# Patient Record
Sex: FEMALE | Race: WHITE
[De-identification: names, ages, dates, MRNs, and addresses within clinical notes are randomized per-mention and may not be internally consistent; named-entity substitution may affect disease eponyms.]

---

## 2018-01-19 ENCOUNTER — HOSPITAL ENCOUNTER (OUTPATIENT)
Dept: HOSPITAL 74 - FASU | Age: 83
Discharge: HOME | End: 2018-01-19
Attending: PLASTIC SURGERY
Payer: COMMERCIAL

## 2018-01-19 VITALS — DIASTOLIC BLOOD PRESSURE: 70 MMHG | HEART RATE: 89 BPM | SYSTOLIC BLOOD PRESSURE: 130 MMHG

## 2018-01-19 VITALS — TEMPERATURE: 97.7 F

## 2018-01-19 VITALS — BODY MASS INDEX: 23.6 KG/M2

## 2018-01-19 DIAGNOSIS — C44.112: Primary | ICD-10-CM

## 2018-01-19 PROCEDURE — 08QQXZZ REPAIR RIGHT LOWER EYELID, EXTERNAL APPROACH: ICD-10-PCS | Performed by: PLASTIC SURGERY

## 2018-01-19 PROCEDURE — 08UQX7Z SUPPLEMENT RIGHT LOWER EYELID WITH AUTOLOGOUS TISSUE SUBSTITUTE, EXTERNAL APPROACH: ICD-10-PCS | Performed by: PLASTIC SURGERY

## 2018-01-19 PROCEDURE — 0HX1XZZ TRANSFER FACE SKIN, EXTERNAL APPROACH: ICD-10-PCS | Performed by: PLASTIC SURGERY

## 2018-01-19 PROCEDURE — 08SP0ZZ REPOSITION LEFT UPPER EYELID, OPEN APPROACH: ICD-10-PCS | Performed by: PLASTIC SURGERY

## 2018-01-19 PROCEDURE — 08QNXZZ REPAIR RIGHT UPPER EYELID, EXTERNAL APPROACH: ICD-10-PCS | Performed by: PLASTIC SURGERY

## 2018-01-19 NOTE — OP
Operative Note





- Note:


Operative Date: 01/19/18


Pre-Operative Diagnosis: right lower and upper eyelid defect


Operation: right upper to lower tarsoconjunctival flap, right lateral 

canthoplasty, right eyelid reconstruction by Tenzel flap, scalp flap 

reconstruction of donor site, tarsorrhaphy suture


Post-Operative Diagnosis: Same as Pre-op


Surgeon: Goldberg,Neal D


Anesthesia: General, Local


Operative Report Dictated: Yes

## 2018-01-20 NOTE — OP
DATE OF OPERATION:  01/19/2018

 

PROCEDURE:  Right lower eyelid reconstruction with the following methods:

1.  Right upper-to-lower tarsoconjunctival flap for conjunctivae and support of the

right lower eyelid.

2.  Right lower eyelid lateral canthoplasty, reconstruction of lateral canthal

attachment.

3.  Tenzel cheek advancement flap reconstruction of right lower eyelid.

4.  Temporal scalp flap reconstruction of donor site of Tenzel flap.

5.  Lateral tarsorrhaphy Frost suture temporary closure of right eyelid.

6.  Excision and debris of existing wound in preparation for flap closure.

 

ATTENDING SURGEON:  Neal Goldberg, MD 

 

ASSISTANT:  None.

 

ANESTHESIA:  General endotracheal anesthesia with a total of 5 mL of 1% lidocaine

with 1:100,000 epinephrine injected off the field preoperatively and an additional 15

mL of 1% lidocaine with 1:100,000 epinephrine injected during the operation as the

procedure progressed.  

 

DESCRIPTION OF PROCEDURE:  The patient was seen in the holding area.  She is

counseled on all risks, benefits, and alternatives of the procedure, which she

understands.  She is prepped and draped.  She is marked.  She is brought to the

operating room.  Sequential compression stockings were applied.  CURTIS hose were

applied.  She was given a gram of Ancef preoperatively.  She was then prepped and

draped in the standard sterile fashion.  A time-out was called.  The procedure, site,

side are verified.  The wound is debrided by curetting of the entire base and

excision of the edges of the wound.  It is determined that the lateral 1/3 of the

lower lid tarsal plate is absent, and there is no competency of the lower eyelid.  To

reconstruct this, a decision was made to use a reconstruction of tarsus and

conjunctivae from the upper eyelid.  This is harvested from the lateral border of the

upper eyelid.  The lid is everted.  Then 5 mm of tarsal plate are left along the

upper eyelid margin.  The superior 4 mm of tarsus are marked, and the tarsal plate

and its underlying conjunctivae are then dissected from the Mccann's muscle leaving

all Mccann's muscle in place.  Maintaining the conjunctival portion toward the globe,

the flap is mobilized to the lower eyelid basing it on the reflection of conjunctivae

at the lateral commissure of the eyelid.  Taking care to have no suture elements on

the internal surface of the reconstruction, the reconstructed tarsal plate from the

flap is secured to the lateral free margin of the tarsal plate on the lower eyelid

with 2 separate 6-0 Prolene sutures.  The knots are within the tissues away from the

globe.  The lateral extent of the tarsal plate on the flap is then used as a sling

and pexied to the internal aspect of the lateral orbital rim at the mid pupillary

level.  This is performed with a 4-0 Prolene suture creating a lateral canthoplasty. 

The residual upper eyelid defect is repaired to the free edge of the flap at the

lateral commissure, thus, entirely reconstructing the lateral canthus.  The remainder

of the tarsoconjunctival flap is then inset to the surrounding residual conjunctival

defect of the lower eyelid without tension using 5-0 plain gut suture.  At this

point, good lower eyelid support is achieved, and the plan for anterior lamellar

reconstruction is performed using a lateral cheek Tenzel-style rotation flap.  This

is then incised and elevated in a subcutaneous plane.  It is back cut along the

preauricular skin and rotated into the defect without tension.  An inferior medial

dogear on the cheek is excised and closed in the orientation of the nasolabial fold. 

The inset is performed with a series of interrupted 6-0 Prolene sutures.  The

superolateral defect on the scalpel preauricularly is then unable to be closed

primarily, and a secondary scalp flap back cut into the hair bearing temporal scalp

is performed and rotated into this secondary defect.  It is secured with a series of

interrupted 4-0 Prolene suture.  The donor site from the scalp flap was able to be

closed primarily with a series of interrupted skin staples.  Because the patient had

some preoperative chemosis from the open wound, the decision was made to protect the

globe with a temporary tarsorrhaphy suture using a 6-0 nylon suture from the residual

upper tarsal plate to the lower eyelid native tarsal plate.  Dexamethasone ointment

is applied intraocularly.  Tissues are viable.  Preemptive nitroglycerin paste is

appreciated to the distal inset portion of the Tenzel cheek flap.  The patient was

dressed, otherwise, with bacitracin, Xeroform, and Hypafix tape.  Was awoken from

anesthesia having tolerated procedure well and transferred to recovery without

complication.

 

 

NEAL GOLDBERG, M.D. [id number]

DD: 01/19/2018 16:50

DT: 01/20/2018 09:19

Job #:  [id number]